# Patient Record
Sex: FEMALE | Race: OTHER | NOT HISPANIC OR LATINO
[De-identification: names, ages, dates, MRNs, and addresses within clinical notes are randomized per-mention and may not be internally consistent; named-entity substitution may affect disease eponyms.]

---

## 2019-05-07 PROBLEM — Z00.00 ENCOUNTER FOR PREVENTIVE HEALTH EXAMINATION: Status: ACTIVE | Noted: 2019-05-07

## 2019-05-14 ENCOUNTER — APPOINTMENT (OUTPATIENT)
Dept: ANTEPARTUM | Facility: CLINIC | Age: 29
End: 2019-05-14
Payer: MEDICAID

## 2019-05-14 ENCOUNTER — ASOB RESULT (OUTPATIENT)
Age: 29
End: 2019-05-14

## 2019-05-14 PROCEDURE — 76813 OB US NUCHAL MEAS 1 GEST: CPT

## 2019-05-14 PROCEDURE — 76801 OB US < 14 WKS SINGLE FETUS: CPT

## 2019-05-14 PROCEDURE — 36416 COLLJ CAPILLARY BLOOD SPEC: CPT

## 2019-07-08 ENCOUNTER — RECORD ABSTRACTING (OUTPATIENT)
Age: 29
End: 2019-07-08

## 2019-07-08 DIAGNOSIS — Z87.440 PERSONAL HISTORY OF URINARY (TRACT) INFECTIONS: ICD-10-CM

## 2019-07-08 LAB — CYTOLOGY CVX/VAG DOC THIN PREP: NORMAL

## 2019-07-12 ENCOUNTER — ASOB RESULT (OUTPATIENT)
Age: 29
End: 2019-07-12

## 2019-07-12 ENCOUNTER — APPOINTMENT (OUTPATIENT)
Dept: ANTEPARTUM | Facility: CLINIC | Age: 29
End: 2019-07-12
Payer: MEDICAID

## 2019-07-12 PROCEDURE — 76811 OB US DETAILED SNGL FETUS: CPT

## 2019-07-22 ENCOUNTER — APPOINTMENT (OUTPATIENT)
Dept: OBGYN | Facility: CLINIC | Age: 29
End: 2019-07-22

## 2019-07-22 ENCOUNTER — APPOINTMENT (OUTPATIENT)
Dept: OBGYN | Facility: CLINIC | Age: 29
End: 2019-07-22
Payer: MEDICAID

## 2019-07-22 ENCOUNTER — NON-APPOINTMENT (OUTPATIENT)
Age: 29
End: 2019-07-22

## 2019-07-22 VITALS
HEART RATE: 77 BPM | DIASTOLIC BLOOD PRESSURE: 63 MMHG | SYSTOLIC BLOOD PRESSURE: 97 MMHG | OXYGEN SATURATION: 98 % | WEIGHT: 144 LBS

## 2019-07-22 VITALS — HEIGHT: 63 IN | BODY MASS INDEX: 25.51 KG/M2

## 2019-07-22 PROCEDURE — 99213 OFFICE O/P EST LOW 20 MIN: CPT | Mod: TH

## 2019-07-26 ENCOUNTER — APPOINTMENT (OUTPATIENT)
Dept: ANTEPARTUM | Facility: CLINIC | Age: 29
End: 2019-07-26
Payer: MEDICAID

## 2019-07-26 ENCOUNTER — ASOB RESULT (OUTPATIENT)
Age: 29
End: 2019-07-26

## 2019-07-26 PROCEDURE — 76816 OB US FOLLOW-UP PER FETUS: CPT

## 2019-08-13 ENCOUNTER — APPOINTMENT (OUTPATIENT)
Dept: OBGYN | Facility: CLINIC | Age: 29
End: 2019-08-13
Payer: MEDICAID

## 2019-08-13 ENCOUNTER — LABORATORY RESULT (OUTPATIENT)
Age: 29
End: 2019-08-13

## 2019-08-13 ENCOUNTER — NON-APPOINTMENT (OUTPATIENT)
Age: 29
End: 2019-08-13

## 2019-08-13 VITALS
BODY MASS INDEX: 26.93 KG/M2 | DIASTOLIC BLOOD PRESSURE: 70 MMHG | WEIGHT: 152 LBS | HEART RATE: 75 BPM | SYSTOLIC BLOOD PRESSURE: 106 MMHG | HEIGHT: 63 IN

## 2019-08-13 PROCEDURE — 99213 OFFICE O/P EST LOW 20 MIN: CPT | Mod: TH

## 2019-08-13 RX ORDER — PRENATAL NO.167/FOLIC ACID/DHA 0.4MG-25MG
0.4-25 TABLET,CHEWABLE ORAL ONCE
Qty: 30 | Refills: 6 | Status: ACTIVE | COMMUNITY
Start: 2019-08-13 | End: 1900-01-01

## 2019-08-13 RX ORDER — MULTIVIT-MIN/FOLIC/VIT K/LYCOP 400-300MCG
28-0.8 TABLET ORAL DAILY
Refills: 0 | Status: DISCONTINUED | COMMUNITY
End: 2019-08-13

## 2019-08-14 LAB
BASOPHILS # BLD AUTO: 0.03 K/UL
BASOPHILS NFR BLD AUTO: 0.3 %
EOSINOPHIL # BLD AUTO: 0.2 K/UL
EOSINOPHIL NFR BLD AUTO: 1.8 %
HCT VFR BLD CALC: 36.4 %
HGB BLD-MCNC: 12 G/DL
IMM GRANULOCYTES NFR BLD AUTO: 0.6 %
LYMPHOCYTES # BLD AUTO: 2.02 K/UL
LYMPHOCYTES NFR BLD AUTO: 18.2 %
MAN DIFF?: NORMAL
MCHC RBC-ENTMCNC: 30.5 PG
MCHC RBC-ENTMCNC: 33 GM/DL
MCV RBC AUTO: 92.6 FL
MONOCYTES # BLD AUTO: 0.81 K/UL
MONOCYTES NFR BLD AUTO: 7.3 %
NEUTROPHILS # BLD AUTO: 7.97 K/UL
NEUTROPHILS NFR BLD AUTO: 71.8 %
PLATELET # BLD AUTO: 237 K/UL
RBC # BLD: 3.93 M/UL
RBC # FLD: 13 %
VZV AB TITR SER: POSITIVE
VZV IGG SER IF-ACNC: 1835 INDEX
WBC # FLD AUTO: 11.1 K/UL

## 2019-08-15 LAB — LEAD BLD-MCNC: <1 UG/DL

## 2019-09-16 ENCOUNTER — NON-APPOINTMENT (OUTPATIENT)
Age: 29
End: 2019-09-16

## 2019-09-16 ENCOUNTER — APPOINTMENT (OUTPATIENT)
Dept: OBGYN | Facility: CLINIC | Age: 29
End: 2019-09-16
Payer: MEDICAID

## 2019-09-16 VITALS
SYSTOLIC BLOOD PRESSURE: 113 MMHG | DIASTOLIC BLOOD PRESSURE: 73 MMHG | HEIGHT: 63 IN | WEIGHT: 160 LBS | BODY MASS INDEX: 28.35 KG/M2

## 2019-09-16 PROCEDURE — 99213 OFFICE O/P EST LOW 20 MIN: CPT | Mod: TH

## 2019-09-30 ENCOUNTER — APPOINTMENT (OUTPATIENT)
Dept: OBGYN | Facility: CLINIC | Age: 29
End: 2019-09-30
Payer: MEDICAID

## 2019-09-30 ENCOUNTER — NON-APPOINTMENT (OUTPATIENT)
Age: 29
End: 2019-09-30

## 2019-09-30 VITALS
SYSTOLIC BLOOD PRESSURE: 110 MMHG | BODY MASS INDEX: 28.88 KG/M2 | DIASTOLIC BLOOD PRESSURE: 74 MMHG | HEIGHT: 63 IN | WEIGHT: 163 LBS

## 2019-09-30 DIAGNOSIS — Z86.19 PERSONAL HISTORY OF OTHER INFECTIOUS AND PARASITIC DISEASES: ICD-10-CM

## 2019-09-30 PROCEDURE — 99213 OFFICE O/P EST LOW 20 MIN: CPT | Mod: TH

## 2019-10-01 LAB
C TRACH RRNA SPEC QL NAA+PROBE: NOT DETECTED
N GONORRHOEA RRNA SPEC QL NAA+PROBE: NOT DETECTED
SOURCE AMPLIFICATION: NORMAL

## 2019-10-02 DIAGNOSIS — B96.89 ACUTE VAGINITIS: ICD-10-CM

## 2019-10-02 DIAGNOSIS — N76.0 ACUTE VAGINITIS: ICD-10-CM

## 2019-10-02 LAB
CANDIDA VAG CYTO: DETECTED
G VAGINALIS+PREV SP MTYP VAG QL MICRO: DETECTED
T VAGINALIS VAG QL WET PREP: NOT DETECTED

## 2019-10-07 ENCOUNTER — TRANSCRIPTION ENCOUNTER (OUTPATIENT)
Age: 29
End: 2019-10-07

## 2019-10-14 ENCOUNTER — APPOINTMENT (OUTPATIENT)
Dept: ANTEPARTUM | Facility: CLINIC | Age: 29
End: 2019-10-14
Payer: MEDICAID

## 2019-10-14 ENCOUNTER — NON-APPOINTMENT (OUTPATIENT)
Age: 29
End: 2019-10-14

## 2019-10-14 ENCOUNTER — ASOB RESULT (OUTPATIENT)
Age: 29
End: 2019-10-14

## 2019-10-14 ENCOUNTER — APPOINTMENT (OUTPATIENT)
Dept: OBGYN | Facility: CLINIC | Age: 29
End: 2019-10-14
Payer: MEDICAID

## 2019-10-14 VITALS
SYSTOLIC BLOOD PRESSURE: 116 MMHG | DIASTOLIC BLOOD PRESSURE: 72 MMHG | BODY MASS INDEX: 29.41 KG/M2 | WEIGHT: 166 LBS | HEIGHT: 63 IN

## 2019-10-14 PROCEDURE — 76816 OB US FOLLOW-UP PER FETUS: CPT

## 2019-10-14 PROCEDURE — 99213 OFFICE O/P EST LOW 20 MIN: CPT | Mod: TH

## 2019-10-30 ENCOUNTER — NON-APPOINTMENT (OUTPATIENT)
Age: 29
End: 2019-10-30

## 2019-10-30 ENCOUNTER — APPOINTMENT (OUTPATIENT)
Dept: OBGYN | Facility: CLINIC | Age: 29
End: 2019-10-30
Payer: MEDICAID

## 2019-10-30 VITALS
DIASTOLIC BLOOD PRESSURE: 71 MMHG | WEIGHT: 172 LBS | BODY MASS INDEX: 30.48 KG/M2 | SYSTOLIC BLOOD PRESSURE: 114 MMHG | HEIGHT: 63 IN

## 2019-10-30 PROCEDURE — 99213 OFFICE O/P EST LOW 20 MIN: CPT | Mod: TH

## 2019-11-01 LAB
GP B STREP DNA SPEC QL NAA+PROBE: NORMAL
GP B STREP DNA SPEC QL NAA+PROBE: NOT DETECTED
SOURCE GBS: NORMAL

## 2019-11-03 LAB
GLUCOSE 1H P 50 G GLC PO SERPL-MCNC: 85 MG/DL
HIV1+2 AB SPEC QL IA.RAPID: NONREACTIVE

## 2019-11-04 ENCOUNTER — APPOINTMENT (OUTPATIENT)
Dept: OBGYN | Facility: CLINIC | Age: 29
End: 2019-11-04
Payer: MEDICAID

## 2019-11-04 VITALS
BODY MASS INDEX: 30.48 KG/M2 | HEIGHT: 63 IN | SYSTOLIC BLOOD PRESSURE: 109 MMHG | DIASTOLIC BLOOD PRESSURE: 76 MMHG | WEIGHT: 172 LBS

## 2019-11-04 PROCEDURE — 99213 OFFICE O/P EST LOW 20 MIN: CPT | Mod: TH

## 2019-11-11 ENCOUNTER — NON-APPOINTMENT (OUTPATIENT)
Age: 29
End: 2019-11-11

## 2019-11-11 ENCOUNTER — APPOINTMENT (OUTPATIENT)
Dept: OBGYN | Facility: CLINIC | Age: 29
End: 2019-11-11
Payer: MEDICAID

## 2019-11-11 VITALS
SYSTOLIC BLOOD PRESSURE: 110 MMHG | WEIGHT: 172 LBS | HEIGHT: 63 IN | DIASTOLIC BLOOD PRESSURE: 75 MMHG | BODY MASS INDEX: 30.48 KG/M2

## 2019-11-11 PROCEDURE — 99213 OFFICE O/P EST LOW 20 MIN: CPT | Mod: TH

## 2019-11-18 ENCOUNTER — OUTPATIENT (OUTPATIENT)
Dept: OUTPATIENT SERVICES | Facility: HOSPITAL | Age: 29
LOS: 1 days | End: 2019-11-18
Payer: MEDICAID

## 2019-11-18 ENCOUNTER — APPOINTMENT (OUTPATIENT)
Dept: OBGYN | Facility: CLINIC | Age: 29
End: 2019-11-18
Payer: MEDICAID

## 2019-11-18 ENCOUNTER — NON-APPOINTMENT (OUTPATIENT)
Age: 29
End: 2019-11-18

## 2019-11-18 VITALS
BODY MASS INDEX: 30.48 KG/M2 | HEIGHT: 63 IN | WEIGHT: 172 LBS | DIASTOLIC BLOOD PRESSURE: 74 MMHG | SYSTOLIC BLOOD PRESSURE: 111 MMHG

## 2019-11-18 DIAGNOSIS — Z3A.39 39 WEEKS GESTATION OF PREGNANCY: ICD-10-CM

## 2019-11-18 DIAGNOSIS — Z01.818 ENCOUNTER FOR OTHER PREPROCEDURAL EXAMINATION: ICD-10-CM

## 2019-11-18 LAB
ANION GAP SERPL CALC-SCNC: 9 MMOL/L — SIGNIFICANT CHANGE UP (ref 5–17)
APTT BLD: 29.1 SEC — SIGNIFICANT CHANGE UP (ref 27.5–36.3)
BLD GP AB SCN SERPL QL: SIGNIFICANT CHANGE UP
BUN SERPL-MCNC: 9 MG/DL — SIGNIFICANT CHANGE UP (ref 7–18)
CALCIUM SERPL-MCNC: 8.8 MG/DL — SIGNIFICANT CHANGE UP (ref 8.4–10.5)
CHLORIDE SERPL-SCNC: 108 MMOL/L — SIGNIFICANT CHANGE UP (ref 96–108)
CO2 SERPL-SCNC: 21 MMOL/L — LOW (ref 22–31)
CREAT SERPL-MCNC: 0.65 MG/DL — SIGNIFICANT CHANGE UP (ref 0.5–1.3)
GLUCOSE SERPL-MCNC: 71 MG/DL — SIGNIFICANT CHANGE UP (ref 70–99)
HCT VFR BLD CALC: 39.5 % — SIGNIFICANT CHANGE UP (ref 34.5–45)
HGB BLD-MCNC: 13.3 G/DL — SIGNIFICANT CHANGE UP (ref 11.5–15.5)
INR BLD: 0.88 RATIO — SIGNIFICANT CHANGE UP (ref 0.88–1.16)
MCHC RBC-ENTMCNC: 30.2 PG — SIGNIFICANT CHANGE UP (ref 27–34)
MCHC RBC-ENTMCNC: 33.7 GM/DL — SIGNIFICANT CHANGE UP (ref 32–36)
MCV RBC AUTO: 89.8 FL — SIGNIFICANT CHANGE UP (ref 80–100)
NRBC # BLD: 0 /100 WBCS — SIGNIFICANT CHANGE UP (ref 0–0)
PLATELET # BLD AUTO: 223 K/UL — SIGNIFICANT CHANGE UP (ref 150–400)
POTASSIUM SERPL-MCNC: 3.7 MMOL/L — SIGNIFICANT CHANGE UP (ref 3.5–5.3)
POTASSIUM SERPL-SCNC: 3.7 MMOL/L — SIGNIFICANT CHANGE UP (ref 3.5–5.3)
PROTHROM AB SERPL-ACNC: 9.7 SEC — LOW (ref 10–12.9)
RBC # BLD: 4.4 M/UL — SIGNIFICANT CHANGE UP (ref 3.8–5.2)
RBC # FLD: 13.4 % — SIGNIFICANT CHANGE UP (ref 10.3–14.5)
SODIUM SERPL-SCNC: 138 MMOL/L — SIGNIFICANT CHANGE UP (ref 135–145)
WBC # BLD: 10.54 K/UL — HIGH (ref 3.8–10.5)
WBC # FLD AUTO: 10.54 K/UL — HIGH (ref 3.8–10.5)

## 2019-11-18 PROCEDURE — 86780 TREPONEMA PALLIDUM: CPT

## 2019-11-18 PROCEDURE — G0463: CPT

## 2019-11-18 PROCEDURE — 99213 OFFICE O/P EST LOW 20 MIN: CPT | Mod: TH

## 2019-11-19 LAB — T PALLIDUM AB TITR SER: NEGATIVE — SIGNIFICANT CHANGE UP

## 2019-11-21 ENCOUNTER — TRANSCRIPTION ENCOUNTER (OUTPATIENT)
Age: 29
End: 2019-11-21

## 2019-11-22 ENCOUNTER — INPATIENT (INPATIENT)
Facility: HOSPITAL | Age: 29
LOS: 2 days | Discharge: HOME | End: 2019-11-25
Attending: OBSTETRICS & GYNECOLOGY | Admitting: OBSTETRICS & GYNECOLOGY
Payer: MEDICAID

## 2019-11-22 ENCOUNTER — RESULT REVIEW (OUTPATIENT)
Age: 29
End: 2019-11-22

## 2019-11-22 ENCOUNTER — TRANSCRIPTION ENCOUNTER (OUTPATIENT)
Age: 29
End: 2019-11-22

## 2019-11-22 VITALS — WEIGHT: 174.17 LBS | HEIGHT: 63 IN

## 2019-11-22 DIAGNOSIS — Z3A.39 39 WEEKS GESTATION OF PREGNANCY: ICD-10-CM

## 2019-11-22 PROCEDURE — 59514 CESAREAN DELIVERY ONLY: CPT | Mod: AS,U9

## 2019-11-22 PROCEDURE — 88307 TISSUE EXAM BY PATHOLOGIST: CPT | Mod: 26

## 2019-11-22 PROCEDURE — 59514 CESAREAN DELIVERY ONLY: CPT | Mod: U9

## 2019-11-22 RX ORDER — OXYCODONE HYDROCHLORIDE 5 MG/1
5 TABLET ORAL
Refills: 0 | Status: DISCONTINUED | OUTPATIENT
Start: 2019-11-22 | End: 2019-11-25

## 2019-11-22 RX ORDER — SODIUM CHLORIDE 9 MG/ML
1000 INJECTION, SOLUTION INTRAVENOUS
Refills: 0 | Status: DISCONTINUED | OUTPATIENT
Start: 2019-11-22 | End: 2019-11-23

## 2019-11-22 RX ORDER — KETOROLAC TROMETHAMINE 30 MG/ML
30 SYRINGE (ML) INJECTION EVERY 6 HOURS
Refills: 0 | Status: DISCONTINUED | OUTPATIENT
Start: 2019-11-22 | End: 2019-11-23

## 2019-11-22 RX ORDER — SIMETHICONE 80 MG/1
80 TABLET, CHEWABLE ORAL EVERY 4 HOURS
Refills: 0 | Status: DISCONTINUED | OUTPATIENT
Start: 2019-11-22 | End: 2019-11-25

## 2019-11-22 RX ORDER — CITRIC ACID/SODIUM CITRATE 300-500 MG
30 SOLUTION, ORAL ORAL ONCE
Refills: 0 | Status: COMPLETED | OUTPATIENT
Start: 2019-11-22 | End: 2019-11-22

## 2019-11-22 RX ORDER — OXYCODONE HYDROCHLORIDE 5 MG/1
5 TABLET ORAL ONCE
Refills: 0 | Status: DISCONTINUED | OUTPATIENT
Start: 2019-11-22 | End: 2019-11-25

## 2019-11-22 RX ORDER — DIPHENHYDRAMINE HCL 50 MG
25 CAPSULE ORAL EVERY 6 HOURS
Refills: 0 | Status: DISCONTINUED | OUTPATIENT
Start: 2019-11-22 | End: 2019-11-25

## 2019-11-22 RX ORDER — HEPARIN SODIUM 5000 [USP'U]/ML
5000 INJECTION INTRAVENOUS; SUBCUTANEOUS EVERY 12 HOURS
Refills: 0 | Status: DISCONTINUED | OUTPATIENT
Start: 2019-11-22 | End: 2019-11-25

## 2019-11-22 RX ORDER — LANOLIN
1 OINTMENT (GRAM) TOPICAL EVERY 6 HOURS
Refills: 0 | Status: DISCONTINUED | OUTPATIENT
Start: 2019-11-22 | End: 2019-11-25

## 2019-11-22 RX ORDER — OXYTOCIN 10 UNIT/ML
333.33 VIAL (ML) INJECTION
Qty: 20 | Refills: 0 | Status: DISCONTINUED | OUTPATIENT
Start: 2019-11-22 | End: 2019-11-23

## 2019-11-22 RX ORDER — CEFAZOLIN SODIUM 1 G
2000 VIAL (EA) INJECTION ONCE
Refills: 0 | Status: COMPLETED | OUTPATIENT
Start: 2019-11-22 | End: 2019-11-22

## 2019-11-22 RX ORDER — METOCLOPRAMIDE HCL 10 MG
10 TABLET ORAL ONCE
Refills: 0 | Status: DISCONTINUED | OUTPATIENT
Start: 2019-11-22 | End: 2019-11-22

## 2019-11-22 RX ORDER — SODIUM CHLORIDE 9 MG/ML
1000 INJECTION, SOLUTION INTRAVENOUS
Refills: 0 | Status: DISCONTINUED | OUTPATIENT
Start: 2019-11-22 | End: 2019-11-22

## 2019-11-22 RX ORDER — MAGNESIUM HYDROXIDE 400 MG/1
30 TABLET, CHEWABLE ORAL DAILY
Refills: 0 | Status: DISCONTINUED | OUTPATIENT
Start: 2019-11-22 | End: 2019-11-25

## 2019-11-22 RX ORDER — FAMOTIDINE 10 MG/ML
20 INJECTION INTRAVENOUS ONCE
Refills: 0 | Status: COMPLETED | OUTPATIENT
Start: 2019-11-22 | End: 2019-11-22

## 2019-11-22 RX ORDER — ACETAMINOPHEN 500 MG
975 TABLET ORAL
Refills: 0 | Status: DISCONTINUED | OUTPATIENT
Start: 2019-11-22 | End: 2019-11-25

## 2019-11-22 RX ORDER — FERROUS SULFATE 325(65) MG
325 TABLET ORAL DAILY
Refills: 0 | Status: DISCONTINUED | OUTPATIENT
Start: 2019-11-23 | End: 2019-11-25

## 2019-11-22 RX ORDER — AZITHROMYCIN 500 MG/1
500 TABLET, FILM COATED ORAL EVERY 24 HOURS
Refills: 0 | Status: COMPLETED | OUTPATIENT
Start: 2019-11-22 | End: 2019-11-22

## 2019-11-22 RX ORDER — GLYCERIN ADULT
1 SUPPOSITORY, RECTAL RECTAL AT BEDTIME
Refills: 0 | Status: DISCONTINUED | OUTPATIENT
Start: 2019-11-22 | End: 2019-11-25

## 2019-11-22 RX ORDER — SODIUM CHLORIDE 9 MG/ML
1000 INJECTION, SOLUTION INTRAVENOUS ONCE
Refills: 0 | Status: COMPLETED | OUTPATIENT
Start: 2019-11-22 | End: 2019-11-22

## 2019-11-22 RX ORDER — IBUPROFEN 200 MG
600 TABLET ORAL EVERY 6 HOURS
Refills: 0 | Status: COMPLETED | OUTPATIENT
Start: 2019-11-22 | End: 2020-10-20

## 2019-11-22 RX ORDER — TETANUS TOXOID, REDUCED DIPHTHERIA TOXOID AND ACELLULAR PERTUSSIS VACCINE, ADSORBED 5; 2.5; 8; 8; 2.5 [IU]/.5ML; [IU]/.5ML; UG/.5ML; UG/.5ML; UG/.5ML
0.5 SUSPENSION INTRAMUSCULAR ONCE
Refills: 0 | Status: DISCONTINUED | OUTPATIENT
Start: 2019-11-22 | End: 2019-11-25

## 2019-11-22 RX ORDER — FOLIC ACID 0.8 MG
1 TABLET ORAL DAILY
Refills: 0 | Status: DISCONTINUED | OUTPATIENT
Start: 2019-11-23 | End: 2019-11-25

## 2019-11-22 RX ADMIN — SODIUM CHLORIDE 125 MILLILITER(S): 9 INJECTION, SOLUTION INTRAVENOUS at 12:00

## 2019-11-22 RX ADMIN — Medication 100 MILLIGRAM(S): at 07:53

## 2019-11-22 RX ADMIN — Medication 975 MILLIGRAM(S): at 22:07

## 2019-11-22 RX ADMIN — Medication 30 MILLIGRAM(S): at 10:45

## 2019-11-22 RX ADMIN — Medication 30 MILLIGRAM(S): at 23:39

## 2019-11-22 RX ADMIN — SIMETHICONE 80 MILLIGRAM(S): 80 TABLET, CHEWABLE ORAL at 21:36

## 2019-11-22 RX ADMIN — Medication 1000 MILLIUNIT(S)/MIN: at 10:24

## 2019-11-22 RX ADMIN — Medication 975 MILLIGRAM(S): at 21:37

## 2019-11-22 RX ADMIN — SIMETHICONE 80 MILLIGRAM(S): 80 TABLET, CHEWABLE ORAL at 18:51

## 2019-11-22 RX ADMIN — Medication 30 MILLIGRAM(S): at 10:26

## 2019-11-22 RX ADMIN — SIMETHICONE 80 MILLIGRAM(S): 80 TABLET, CHEWABLE ORAL at 15:23

## 2019-11-22 RX ADMIN — SODIUM CHLORIDE 2000 MILLILITER(S): 9 INJECTION, SOLUTION INTRAVENOUS at 07:49

## 2019-11-22 RX ADMIN — FAMOTIDINE 20 MILLIGRAM(S): 10 INJECTION INTRAVENOUS at 07:47

## 2019-11-22 RX ADMIN — AZITHROMYCIN 255 MILLIGRAM(S): 500 TABLET, FILM COATED ORAL at 08:15

## 2019-11-22 RX ADMIN — HEPARIN SODIUM 5000 UNIT(S): 5000 INJECTION INTRAVENOUS; SUBCUTANEOUS at 18:52

## 2019-11-22 RX ADMIN — Medication 30 MILLILITER(S): at 07:47

## 2019-11-22 NOTE — DISCHARGE NOTE OB - CARE PLAN
Principal Discharge DX:	 delivery delivered  Goal:	doing good  Assessment and plan of treatment:	f/u [id number] in 2weeks,regular diet,activity as tolerated  Secondary Diagnosis:	Breech birth Principal Discharge DX:	 delivery delivered  Goal:	wound check in office 1-2weeks call and set up appointment with dr tan  Assessment and plan of treatment:	no sex nothing in vagina no heavy lifting no pushing no straining no strenuous activities  pain medication as needed; stool softener; dulcolax as needed if constipated  walk for exercise: helps recovery   continue prenatal vitamins daily especially whole course of breastfeeding  see your OB in the office for follow up post partum check call the office set up appointment in 1-2weeks  clean wound daily with soap and water; please note wound for redness or swelling; if noted go to the office right away so the doctor can evaluate the wound for possible wound infection  Secondary Diagnosis:	Breech birth Principal Discharge DX:	 delivery delivered  Goal:	wound check in office 1-2weeks call and set up appointment with dr tan  Assessment and plan of treatment:	no sex nothing in vagina no heavy lifting no pushing no straining no strenuous activities  pain medication as needed; stool softener; dulcolax as needed if constipated  walk for exercise: helps recovery   continue prenatal vitamins daily especially whole course of breastfeeding  see your OB in the office for follow up post partum check call the office set up appointment in 1-2weeks  clean wound daily with soap and water; please note wound for redness or swelling; if noted go to the office right away so the doctor can evaluate the wound for possible wound infection  Secondary Diagnosis:	Breech birth  Secondary Diagnosis:	Acute blood loss as cause of postoperative anemia  Goal:	take iron daily

## 2019-11-22 NOTE — DISCHARGE NOTE OB - CARE PROVIDER_API CALL
Dmitry Blevins)  Obstetrics and Gynecology  [address], [address]nd [place] [address] [place] [address]  Phone: [phone]  Fax: [phone]  Follow Up Time:

## 2019-11-22 NOTE — DISCHARGE NOTE OB - MEDICATION SUMMARY - MEDICATIONS TO TAKE
I will START or STAY ON the medications listed below when I get home from the hospital:    acetaminophen 500 mg oral capsule  -- 2 cap(s) by mouth every 6 hours   -- This product contains acetaminophen.  Do not use  with any other product containing acetaminophen to prevent possible liver damage.    -- Indication: For for pain    ketorolac 10 mg oral tablet  -- 1 tab(s) by mouth 4 times a day   -- It is very important that you take or use this exactly as directed.  Do not skip doses or discontinue unless directed by your doctor.  May cause drowsiness or dizziness.  Obtain medical advice before taking any non-prescription drugs as some may affect the action of this medication.  Take with food or milk.    -- Indication: For for severe pain    Prenatal Multivitamins with Folic Acid 1 mg oral tablet  -- 1 tab(s) by mouth once a day  -- Indication: For supplement    Slow Release Iron (as elemental iron) 45 mg oral tablet, extended release  -- 1 tab(s) by mouth once a day   -- Check with your doctor before becoming pregnant.  May discolor urine or feces.  Some non-prescription drugs may aggravate your condition.  Read all labels carefully.  If a warning appears, check with your doctor before taking.  Swallow whole.  Do not crush.    -- Indication: For mild anemia post csection    Nasrin-Colace 50 mg-8.6 mg oral tablet  -- 2 tab(s) by mouth once a day (at bedtime)   -- Medication should be taken with plenty of water.    -- Indication: For take daily to prevent constipation    simethicone 80 mg oral tablet, chewable  -- 1 tab(s) by mouth every 4 hours  -- Indication: For helps pass gas

## 2019-11-22 NOTE — DISCHARGE NOTE OB - PATIENT PORTAL LINK FT
You can access the FollowMyHealth Patient Portal offered by [facility] by registering at the following website: [email or web address]. By joining [employer]’s FollowMyHealth portal, you will also be able to view your health information using other applications (apps) compatible with our system.

## 2019-11-22 NOTE — PATIENT PROFILE OB - FALLEN IN THE PAST
no Patient with uncontrolled hypertension and new onset of left hand and left chest pain. Will get bp under control and then will have patient undergo an exercise stress test.

## 2019-11-22 NOTE — DISCHARGE NOTE OB - PLAN OF CARE
doing good f/u [id number] in 2weeks,regular diet,activity as tolerated wound check in office 1-2weeks call and set up appointment with dr tan no sex nothing in vagina no heavy lifting no pushing no straining no strenuous activities  pain medication as needed; stool softener; dulcolax as needed if constipated  walk for exercise: helps recovery   continue prenatal vitamins daily especially whole course of breastfeeding  see your OB in the office for follow up post partum check call the office set up appointment in 1-2weeks  clean wound daily with soap and water; please note wound for redness or swelling; if noted go to the office right away so the doctor can evaluate the wound for possible wound infection take iron daily

## 2019-11-22 NOTE — PROGRESS NOTE ADULT - SUBJECTIVE AND OBJECTIVE BOX
post op day 0  prim cs for breech    pt resting well   offers no acute complaints  pain well controlled    Vital Signs Last 24 Hrs  T(C): 36.4 (22 Nov 2019 12:52), Max: 36.4 (22 Nov 2019 12:52)  T(F): 97.6 (22 Nov 2019 12:52), Max: 97.6 (22 Nov 2019 12:52)  HR: 93 (22 Nov 2019 12:52) (64 - 99)  BP: 105/64 (22 Nov 2019 12:52) (105/64 - 125/73)  BP(mean): 79 (22 Nov 2019 11:02) (78 - 87)  RR: 18 (22 Nov 2019 12:52) (11 - 19)  SpO2: 99% (22 Nov 2019 12:52) (98% - 100%)    abd soft ND dressing c/d/intact  lochia min  ratliff clear urine output  ext +venodyne boots b/l    post op day 0  prim cs for breech  -cont routine post op care

## 2019-11-22 NOTE — DISCHARGE NOTE OB - PAIN PRESENT
Yes No/currently denies pain ...last pain meds given at approx 8am ..pain meds sent to pharmacy ..instructed  pt to  meds and  explained instructions pt  verbalized understanding

## 2019-11-22 NOTE — DISCHARGE NOTE OB - ADDITIONAL INSTRUCTIONS
Patient name: Carmelo Soto   Person calling: Ashleigh from [facility] pharmacy   Phone Number: [phone]   Best Availability:   Fax Number:   Facility name:  Verbal disclosure:   Permission to leave detailed message:   Who person saw gladys Pinto   Name of pharmacy:  [facility], [place]  Name of medication: BD pen needles   needed: No    Detailed reason for call:  Ashleigh from [facility] calling for refill of BD pen needles 4mm 32 gauge please.    given

## 2019-11-23 LAB
BASOPHILS # BLD AUTO: 0.02 K/UL — SIGNIFICANT CHANGE UP (ref 0–0.2)
BASOPHILS NFR BLD AUTO: 0.2 % — SIGNIFICANT CHANGE UP (ref 0–2)
EOSINOPHIL # BLD AUTO: 0.03 K/UL — SIGNIFICANT CHANGE UP (ref 0–0.5)
EOSINOPHIL NFR BLD AUTO: 0.3 % — SIGNIFICANT CHANGE UP (ref 0–6)
HCT VFR BLD CALC: 31.1 % — LOW (ref 34.5–45)
HGB BLD-MCNC: 10.4 G/DL — LOW (ref 11.5–15.5)
IMM GRANULOCYTES NFR BLD AUTO: 0.3 % — SIGNIFICANT CHANGE UP (ref 0–1.5)
LYMPHOCYTES # BLD AUTO: 1.73 K/UL — SIGNIFICANT CHANGE UP (ref 1–3.3)
LYMPHOCYTES # BLD AUTO: 20 % — SIGNIFICANT CHANGE UP (ref 13–44)
MCHC RBC-ENTMCNC: 30.2 PG — SIGNIFICANT CHANGE UP (ref 27–34)
MCHC RBC-ENTMCNC: 33.4 GM/DL — SIGNIFICANT CHANGE UP (ref 32–36)
MCV RBC AUTO: 90.4 FL — SIGNIFICANT CHANGE UP (ref 80–100)
MONOCYTES # BLD AUTO: 0.78 K/UL — SIGNIFICANT CHANGE UP (ref 0–0.9)
MONOCYTES NFR BLD AUTO: 9 % — SIGNIFICANT CHANGE UP (ref 2–14)
NEUTROPHILS # BLD AUTO: 6.06 K/UL — SIGNIFICANT CHANGE UP (ref 1.8–7.4)
NEUTROPHILS NFR BLD AUTO: 70.2 % — SIGNIFICANT CHANGE UP (ref 43–77)
NRBC # BLD: 0 /100 WBCS — SIGNIFICANT CHANGE UP (ref 0–0)
PLATELET # BLD AUTO: 157 K/UL — SIGNIFICANT CHANGE UP (ref 150–400)
RBC # BLD: 3.44 M/UL — LOW (ref 3.8–5.2)
RBC # FLD: 13.4 % — SIGNIFICANT CHANGE UP (ref 10.3–14.5)
WBC # BLD: 8.65 K/UL — SIGNIFICANT CHANGE UP (ref 3.8–10.5)
WBC # FLD AUTO: 8.65 K/UL — SIGNIFICANT CHANGE UP (ref 3.8–10.5)

## 2019-11-23 PROCEDURE — 99232 SBSQ HOSP IP/OBS MODERATE 35: CPT

## 2019-11-23 RX ORDER — IBUPROFEN 200 MG
600 TABLET ORAL EVERY 6 HOURS
Refills: 0 | Status: DISCONTINUED | OUTPATIENT
Start: 2019-11-23 | End: 2019-11-25

## 2019-11-23 RX ADMIN — HEPARIN SODIUM 5000 UNIT(S): 5000 INJECTION INTRAVENOUS; SUBCUTANEOUS at 18:48

## 2019-11-23 RX ADMIN — Medication 975 MILLIGRAM(S): at 22:40

## 2019-11-23 RX ADMIN — Medication 600 MILLIGRAM(S): at 23:12

## 2019-11-23 RX ADMIN — MAGNESIUM HYDROXIDE 30 MILLILITER(S): 400 TABLET, CHEWABLE ORAL at 13:15

## 2019-11-23 RX ADMIN — SIMETHICONE 80 MILLIGRAM(S): 80 TABLET, CHEWABLE ORAL at 22:07

## 2019-11-23 RX ADMIN — Medication 975 MILLIGRAM(S): at 10:04

## 2019-11-23 RX ADMIN — SIMETHICONE 80 MILLIGRAM(S): 80 TABLET, CHEWABLE ORAL at 10:03

## 2019-11-23 RX ADMIN — Medication 1 TABLET(S): at 13:15

## 2019-11-23 RX ADMIN — SIMETHICONE 80 MILLIGRAM(S): 80 TABLET, CHEWABLE ORAL at 16:44

## 2019-11-23 RX ADMIN — Medication 975 MILLIGRAM(S): at 22:06

## 2019-11-23 RX ADMIN — Medication 30 MILLIGRAM(S): at 05:38

## 2019-11-23 RX ADMIN — SIMETHICONE 80 MILLIGRAM(S): 80 TABLET, CHEWABLE ORAL at 18:48

## 2019-11-23 RX ADMIN — SIMETHICONE 80 MILLIGRAM(S): 80 TABLET, CHEWABLE ORAL at 05:37

## 2019-11-23 RX ADMIN — Medication 1 MILLIGRAM(S): at 13:15

## 2019-11-23 RX ADMIN — HEPARIN SODIUM 5000 UNIT(S): 5000 INJECTION INTRAVENOUS; SUBCUTANEOUS at 05:38

## 2019-11-23 RX ADMIN — Medication 975 MILLIGRAM(S): at 02:28

## 2019-11-23 RX ADMIN — Medication 975 MILLIGRAM(S): at 16:43

## 2019-11-23 RX ADMIN — Medication 30 MILLIGRAM(S): at 00:10

## 2019-11-23 RX ADMIN — Medication 975 MILLIGRAM(S): at 11:00

## 2019-11-23 RX ADMIN — Medication 975 MILLIGRAM(S): at 03:00

## 2019-11-23 RX ADMIN — Medication 30 MILLIGRAM(S): at 07:04

## 2019-11-23 RX ADMIN — SIMETHICONE 80 MILLIGRAM(S): 80 TABLET, CHEWABLE ORAL at 02:28

## 2019-11-23 RX ADMIN — Medication 600 MILLIGRAM(S): at 23:45

## 2019-11-23 RX ADMIN — Medication 975 MILLIGRAM(S): at 17:24

## 2019-11-23 RX ADMIN — Medication 325 MILLIGRAM(S): at 13:15

## 2019-11-23 NOTE — PROGRESS NOTE ADULT - SUBJECTIVE AND OBJECTIVE BOX
PA NOTE:  pod#1 s/p prim cs    in the room     Patient seen at bedside resting comfortably offers no new complaints. + ambulating; due to void post ratliff removal + flatus; tolerating regular diet. both breastfeeding and bottle feeding. Denies HA, CP, SOB, N/V/D,  no bm; dizziness, palpitations, worsening abdominal pain, worsening vaginal bleeding, or any other concerns.     Vital Signs Last 24 Hrs  T(C): 36.5 (2019 10:47), Max: 36.9 (2019 21:53)  T(F): 97.7 (2019 10:47), Max: 98.5 (2019 21:53)  HR: 78 (2019 10:47) (73 - 99)  BP: 102/63 (2019 10:47) (100/62 - 125/73)  BP(mean): --  RR: 16 (2019 10:47) (15 - 19)  SpO2: 98% (2019 10:47) (98% - 100%)    Gen: A&O x 3, NAD  Chest: CTABL  Cardiac: S1+S2+ RRR  Breast: Soft, nontender, nonengorged  Abdomen: +BS; soft; Nontender, nondistended, dressing removed Incision C/D/I steri strips in place   Gyn: Minimal lochia  Extremities: Nontender, DTRS 2+, no worsening edema                          10.4   8.65  )-----------( 157      ( 2019 06:59 )             31.1

## 2019-11-23 NOTE — PROGRESS NOTE ADULT - SUBJECTIVE AND OBJECTIVE BOX
Postpartum Note,  Section  She is a  29y woman who is now post-operative day: 3  HPI:    Subjective:  The patient feels well.  She is ambulating.   She is tolerating regular diet.  She denies nausea and vomiting.    Her pain is controlled.  She reports normal postpartum bleeding.  She is breastfeeding.  She is formula feeding.    Physical exam  Vital Signs Last 24 Hrs  T(C): 36.5 (2019 10:47), Max: 36.9 (2019 21:53)  T(F): 97.7 (2019 10:47), Max: 98.5 (2019 21:53)  HR: 78 (2019 10:47) (73 - 93)  BP: 102/63 (2019 10:47) (100/62 - 123/82)  BP(mean): --  RR: 16 (2019 10:47) (16 - 18)  SpO2: 98% (2019 10:47) (98% - 99%)          Gen: NAD  Breast: Soft, nontender, not engorged.  Abdomen: Soft, nontender, no distension , firm uterine fundus at umbilicus.  Incision: Clean, dry, and intact with steri strips,had no bowel movement  Pelvic: Normal lochia noted  Ext: No calf tenderness    LABS:                              10.4   8.65  )-----------( 157      ( 2019 06:59 )             31.1     Rubella status: immune    no known allergies  Assessment and Plan  POD # s/p  section day1  Doing well.  Encourage ambulation.

## 2019-11-23 NOTE — PROGRESS NOTE ADULT - ASSESSMENT
PA NOTE:  pod#1 s/p prim cs    in the room   -routine ob care post op  -rpt cbc in am  -dc planning in am    MEDICATIONS  (STANDING):  acetaminophen   Tablet .. 975 milliGRAM(s) Oral <User Schedule>  diphtheria/tetanus/pertussis (acellular) Vaccine (ADAcel) 0.5 milliLiter(s) IntraMuscular once  ferrous    sulfate 325 milliGRAM(s) Oral daily  folic acid 1 milliGRAM(s) Oral daily  heparin  Injectable 5000 Unit(s) SubCutaneous every 12 hours  ibuprofen  Tablet. 600 milliGRAM(s) Oral every 6 hours  magnesium hydroxide Suspension 30 milliLiter(s) Oral daily  prenatal multivitamin 1 Tablet(s) Oral daily  simethicone 80 milliGRAM(s) Chew every 4 hours    MEDICATIONS  (PRN):  diphenhydrAMINE 25 milliGRAM(s) Oral every 6 hours PRN Itching  glycerin Suppository - Adult 1 Suppository(s) Rectal at bedtime PRN Constipation  lanolin Ointment 1 Application(s) Topical every 6 hours PRN Sore Nipples  oxyCODONE    IR 5 milliGRAM(s) Oral every 3 hours PRN Moderate to Severe Pain (4-10)  oxyCODONE    IR 5 milliGRAM(s) Oral once PRN Moderate to Severe Pain (4-10)

## 2019-11-24 LAB
BASOPHILS # BLD AUTO: 0.02 K/UL — SIGNIFICANT CHANGE UP (ref 0–0.2)
BASOPHILS NFR BLD AUTO: 0.2 % — SIGNIFICANT CHANGE UP (ref 0–2)
EOSINOPHIL # BLD AUTO: 0.09 K/UL — SIGNIFICANT CHANGE UP (ref 0–0.5)
EOSINOPHIL NFR BLD AUTO: 1 % — SIGNIFICANT CHANGE UP (ref 0–6)
HCT VFR BLD CALC: 30.3 % — LOW (ref 34.5–45)
HGB BLD-MCNC: 10.1 G/DL — LOW (ref 11.5–15.5)
IMM GRANULOCYTES NFR BLD AUTO: 0.4 % — SIGNIFICANT CHANGE UP (ref 0–1.5)
LYMPHOCYTES # BLD AUTO: 2.25 K/UL — SIGNIFICANT CHANGE UP (ref 1–3.3)
LYMPHOCYTES # BLD AUTO: 24.1 % — SIGNIFICANT CHANGE UP (ref 13–44)
MCHC RBC-ENTMCNC: 30.1 PG — SIGNIFICANT CHANGE UP (ref 27–34)
MCHC RBC-ENTMCNC: 33.3 GM/DL — SIGNIFICANT CHANGE UP (ref 32–36)
MCV RBC AUTO: 90.2 FL — SIGNIFICANT CHANGE UP (ref 80–100)
MONOCYTES # BLD AUTO: 0.79 K/UL — SIGNIFICANT CHANGE UP (ref 0–0.9)
MONOCYTES NFR BLD AUTO: 8.5 % — SIGNIFICANT CHANGE UP (ref 2–14)
NEUTROPHILS # BLD AUTO: 6.13 K/UL — SIGNIFICANT CHANGE UP (ref 1.8–7.4)
NEUTROPHILS NFR BLD AUTO: 65.8 % — SIGNIFICANT CHANGE UP (ref 43–77)
NRBC # BLD: 0 /100 WBCS — SIGNIFICANT CHANGE UP (ref 0–0)
PLATELET # BLD AUTO: 171 K/UL — SIGNIFICANT CHANGE UP (ref 150–400)
RBC # BLD: 3.36 M/UL — LOW (ref 3.8–5.2)
RBC # FLD: 13.6 % — SIGNIFICANT CHANGE UP (ref 10.3–14.5)
WBC # BLD: 9.32 K/UL — SIGNIFICANT CHANGE UP (ref 3.8–10.5)
WBC # FLD AUTO: 9.32 K/UL — SIGNIFICANT CHANGE UP (ref 3.8–10.5)

## 2019-11-24 PROCEDURE — 99232 SBSQ HOSP IP/OBS MODERATE 35: CPT

## 2019-11-24 RX ADMIN — SIMETHICONE 80 MILLIGRAM(S): 80 TABLET, CHEWABLE ORAL at 22:14

## 2019-11-24 RX ADMIN — Medication 600 MILLIGRAM(S): at 17:43

## 2019-11-24 RX ADMIN — Medication 600 MILLIGRAM(S): at 12:16

## 2019-11-24 RX ADMIN — Medication 600 MILLIGRAM(S): at 13:00

## 2019-11-24 RX ADMIN — SIMETHICONE 80 MILLIGRAM(S): 80 TABLET, CHEWABLE ORAL at 16:02

## 2019-11-24 RX ADMIN — SIMETHICONE 80 MILLIGRAM(S): 80 TABLET, CHEWABLE ORAL at 03:12

## 2019-11-24 RX ADMIN — Medication 325 MILLIGRAM(S): at 12:15

## 2019-11-24 RX ADMIN — Medication 1 MILLIGRAM(S): at 12:15

## 2019-11-24 RX ADMIN — Medication 975 MILLIGRAM(S): at 22:14

## 2019-11-24 RX ADMIN — HEPARIN SODIUM 5000 UNIT(S): 5000 INJECTION INTRAVENOUS; SUBCUTANEOUS at 17:42

## 2019-11-24 RX ADMIN — Medication 600 MILLIGRAM(S): at 18:12

## 2019-11-24 RX ADMIN — SIMETHICONE 80 MILLIGRAM(S): 80 TABLET, CHEWABLE ORAL at 06:11

## 2019-11-24 RX ADMIN — MAGNESIUM HYDROXIDE 30 MILLILITER(S): 400 TABLET, CHEWABLE ORAL at 12:15

## 2019-11-24 RX ADMIN — Medication 975 MILLIGRAM(S): at 10:00

## 2019-11-24 RX ADMIN — Medication 975 MILLIGRAM(S): at 23:15

## 2019-11-24 RX ADMIN — Medication 1 TABLET(S): at 12:15

## 2019-11-24 RX ADMIN — SIMETHICONE 80 MILLIGRAM(S): 80 TABLET, CHEWABLE ORAL at 09:06

## 2019-11-24 RX ADMIN — Medication 600 MILLIGRAM(S): at 06:11

## 2019-11-24 RX ADMIN — Medication 975 MILLIGRAM(S): at 03:12

## 2019-11-24 RX ADMIN — Medication 600 MILLIGRAM(S): at 06:53

## 2019-11-24 RX ADMIN — HEPARIN SODIUM 5000 UNIT(S): 5000 INJECTION INTRAVENOUS; SUBCUTANEOUS at 06:11

## 2019-11-24 RX ADMIN — Medication 975 MILLIGRAM(S): at 03:15

## 2019-11-24 RX ADMIN — Medication 975 MILLIGRAM(S): at 09:07

## 2019-11-24 RX ADMIN — SIMETHICONE 80 MILLIGRAM(S): 80 TABLET, CHEWABLE ORAL at 17:42

## 2019-11-24 NOTE — PROGRESS NOTE ADULT - REASON FOR ADMISSION
came for  section for breech presentation
came for primary  section for breech
scheduled primary cs for breech

## 2019-11-24 NOTE — PROGRESS NOTE ADULT - SUBJECTIVE AND OBJECTIVE BOX
Postpartum Note,  Section  She is a  29y woman who is now post-operative day: 3  HPI:    Subjective:  The patient feels well.  She is ambulating.   She is tolerating regular diet.  She denies nausea and vomiting.    Her pain is controlled.  She reports normal postpartum bleeding.  She is breastfeeding.  She is formula feeding.    Physical exam  Vital Signs Last 24 Hrs  T(C): 36.8 (2019 05:42), Max: 36.8 (2019 05:42)  T(F): 98.2 (2019 05:42), Max: 98.2 (2019 05:42)  HR: 77 (2019 05:42) (77 - 93)  BP: 107/68 (2019 05:42) (102/63 - 111/78)  BP(mean): --  RR: 16 (2019 05:42) (16 - 17)  SpO2: 97% (2019 05:42) (97% - 99%)          Gen: NAD  Breast: Soft, nontender, not engorged.  Abdomen: Soft, nontender, no distension , firm uterine fundus at umbilicus.  Incision: Clean, dry, and intact with steri strips,had no bowel movement  Pelvic: Normal lochia noted  Ext: No calf tenderness    LABS:                              10.1   9.32  )-----------( 171      ( 2019 07:10 )             30.3     Rubella status: immune    no known allergies  Assessment and Plan  POD # s/p  section day2  Doing well.  Encourage ambulation.d/c home tomorrow

## 2019-11-24 NOTE — PROGRESS NOTE ADULT - SUBJECTIVE AND OBJECTIVE BOX
Patient evaluated at bedside, offers no acute complaints.  Resting comfortably with baby at bedside.  Tolerating regular diet, Voiding without diffficulty; +flatus, -BM  Currently breast and bottlefeeding.  Denies fever/chills, nausea/vomiting, headache, dizziness, chest pains, shortness of breath, palpitations    Vital Signs Last 24 Hrs  T(C): 36.8 (24 Nov 2019 05:42), Max: 36.8 (24 Nov 2019 05:42)  T(F): 98.2 (24 Nov 2019 05:42), Max: 98.2 (24 Nov 2019 05:42)  HR: 77 (24 Nov 2019 05:42) (77 - 93)  BP: 107/68 (24 Nov 2019 05:42) (102/63 - 111/78)  RR: 16 (24 Nov 2019 05:42) (16 - 17)  SpO2: 97% (24 Nov 2019 05:42) (97% - 99%)    PE: Pt appears comfortable, NAD, AAOx3  Chest: CTA bilaterally, no W/R/R  Cardiac: RRR  Breasts: soft, NT/nonengorged bilaterally; no masses, no erythema  Abd: soft; NT; no guarding or rebound; +BS x4 quad; Fundus firm NT; incision C/D/I with steristrips in place, no erythema, no wound drainage or bleeding, no swelling  Gyn: minimal  Ext: soft, NT; DTRs 2+ bilaterally; no worsening edema                          10.1   9.32  )-----------( 171      ( 24 Nov 2019 07:10 )             30.3         d/w Dr. Blevins

## 2019-11-25 VITALS
HEART RATE: 70 BPM | SYSTOLIC BLOOD PRESSURE: 104 MMHG | RESPIRATION RATE: 16 BRPM | TEMPERATURE: 98 F | OXYGEN SATURATION: 99 % | DIASTOLIC BLOOD PRESSURE: 71 MMHG

## 2019-11-25 DIAGNOSIS — D62 ACUTE POSTHEMORRHAGIC ANEMIA: ICD-10-CM

## 2019-11-25 PROCEDURE — 88307 TISSUE EXAM BY PATHOLOGIST: CPT

## 2019-11-25 PROCEDURE — 36415 COLL VENOUS BLD VENIPUNCTURE: CPT

## 2019-11-25 PROCEDURE — 86901 BLOOD TYPING SEROLOGIC RH(D): CPT

## 2019-11-25 PROCEDURE — 86923 COMPATIBILITY TEST ELECTRIC: CPT

## 2019-11-25 PROCEDURE — 59050 FETAL MONITOR W/REPORT: CPT

## 2019-11-25 PROCEDURE — 99238 HOSP IP/OBS DSCHRG MGMT 30/<: CPT

## 2019-11-25 PROCEDURE — 86850 RBC ANTIBODY SCREEN: CPT

## 2019-11-25 PROCEDURE — 86900 BLOOD TYPING SEROLOGIC ABO: CPT

## 2019-11-25 PROCEDURE — 85027 COMPLETE CBC AUTOMATED: CPT

## 2019-11-25 RX ORDER — KETOROLAC TROMETHAMINE 30 MG/ML
1 SYRINGE (ML) INJECTION
Qty: 20 | Refills: 0
Start: 2019-11-25 | End: 2019-11-29

## 2019-11-25 RX ORDER — SENNOSIDES/DOCUSATE SODIUM 8.6MG-50MG
2 TABLET ORAL
Qty: 60 | Refills: 0
Start: 2019-11-25 | End: 2019-12-24

## 2019-11-25 RX ORDER — SIMETHICONE 80 MG/1
1 TABLET, CHEWABLE ORAL
Qty: 60 | Refills: 0
Start: 2019-11-25 | End: 2019-12-04

## 2019-11-25 RX ORDER — ACETAMINOPHEN 500 MG
2 TABLET ORAL
Qty: 40 | Refills: 1
Start: 2019-11-25 | End: 2019-12-04

## 2019-11-25 RX ORDER — FERROUS SULFATE 325(65) MG
1 TABLET ORAL
Qty: 30 | Refills: 0
Start: 2019-11-25 | End: 2019-12-24

## 2019-11-25 RX ADMIN — SIMETHICONE 80 MILLIGRAM(S): 80 TABLET, CHEWABLE ORAL at 05:10

## 2019-11-25 RX ADMIN — Medication 600 MILLIGRAM(S): at 00:24

## 2019-11-25 RX ADMIN — Medication 600 MILLIGRAM(S): at 01:00

## 2019-11-25 RX ADMIN — HEPARIN SODIUM 5000 UNIT(S): 5000 INJECTION INTRAVENOUS; SUBCUTANEOUS at 05:10

## 2019-11-25 RX ADMIN — Medication 975 MILLIGRAM(S): at 08:03

## 2019-11-25 RX ADMIN — Medication 600 MILLIGRAM(S): at 05:10

## 2019-11-25 RX ADMIN — Medication 600 MILLIGRAM(S): at 05:50

## 2019-11-25 NOTE — PROGRESS NOTE ADULT - ASSESSMENT
PA NOTE:  pod#3 prim cs   in the room   -dr tan coming to see pt  -dc plan today  -dc instructions verbalized; attend dc class

## 2019-11-25 NOTE — PROGRESS NOTE ADULT - SUBJECTIVE AND OBJECTIVE BOX
PA NOTE:  pod#3 prim cs   in the room     Patient seen at bedside resting comfortably offers no new complaints. + Ambulation, + void without difficulty, + flatus; +bm;  tolerating regular diet. both breastfeeding and bottle feeding. Denies HA, CP, SOB, N/V/D,  dizziness, palpitations, worsening abdominal pain, worsening vaginal bleeding, or any other concerns.     Vital Signs Last 24 Hrs  T(C): 36.6 (2019 05:09), Max: 36.8 (2019 12:18)  T(F): 97.8 (2019 05:09), Max: 98.3 (2019 12:18)  HR: 70 (2019 05:09) (70 - 88)  BP: 104/71 (2019 05:09) (104/71 - 117/79)  BP(mean): --  RR: 16 (2019 05:09) (16 - 17)  SpO2: 99% (2019 05:09) (98% - 99%)    Gen: A&O x 3, NAD  Chest: CTABL  Cardiac: S1+S2+ RRR  Breast: Soft, nontender, nonengorged  Abdomen: +BS; soft; Nontender, nondistended, dressing removed Incision C/D/I steri strips in place   Gyn: Minimal lochia  Extremities: Nontender, DTRS 2+, no worsening edema                          10.1   9.32  )-----------( 171      ( 2019 07:10 )             30.3

## 2019-12-11 ENCOUNTER — APPOINTMENT (OUTPATIENT)
Dept: OBGYN | Facility: CLINIC | Age: 29
End: 2019-12-11

## 2019-12-11 VITALS
HEIGHT: 63 IN | HEART RATE: 77 BPM | BODY MASS INDEX: 26.58 KG/M2 | TEMPERATURE: 97.7 F | WEIGHT: 150 LBS | OXYGEN SATURATION: 98 % | SYSTOLIC BLOOD PRESSURE: 112 MMHG | DIASTOLIC BLOOD PRESSURE: 73 MMHG

## 2019-12-11 DIAGNOSIS — Z34.00 ENCOUNTER FOR SUPERVISION OF NORMAL FIRST PREGNANCY, UNSPECIFIED TRIMESTER: ICD-10-CM

## 2019-12-11 DIAGNOSIS — O46.90 ANTEPARTUM HEMORRHAGE, UNSPECIFIED, UNSPECIFIED TRIMESTER: ICD-10-CM

## 2019-12-11 DIAGNOSIS — Z09 ENCOUNTER FOR FOLLOW-UP EXAMINATION AFTER COMPLETED TREATMENT FOR CONDITIONS OTHER THAN MALIGNANT NEOPLASM: ICD-10-CM

## 2019-12-11 RX ORDER — CAMPHOR 0.45 %
25 GEL (GRAM) TOPICAL
Qty: 20 | Refills: 0 | Status: DISCONTINUED | COMMUNITY
Start: 2019-09-30 | End: 2019-12-11

## 2019-12-11 NOTE — HISTORY OF PRESENT ILLNESS
[0/10] : no pain reported [Fever] : no fever [Chills] : no chills [Nausea] : no nausea [Clean/Dry/Intact] : clean, dry and intact [Vomiting] : no vomiting [Normal] : the vagina was normal [Erythema] : not erythematous [Doing Well] : is doing well [Excellent Pain Control] : has excellent pain control [No Sign of Infection] : is showing no signs of infection [None] : None

## 2020-01-08 ENCOUNTER — APPOINTMENT (OUTPATIENT)
Dept: OBGYN | Facility: CLINIC | Age: 30
End: 2020-01-08
Payer: MEDICAID

## 2020-01-08 ENCOUNTER — NON-APPOINTMENT (OUTPATIENT)
Age: 30
End: 2020-01-08

## 2020-01-08 VITALS
OXYGEN SATURATION: 98 % | DIASTOLIC BLOOD PRESSURE: 68 MMHG | WEIGHT: 150 LBS | HEART RATE: 78 BPM | HEIGHT: 63 IN | TEMPERATURE: 98 F | SYSTOLIC BLOOD PRESSURE: 102 MMHG | BODY MASS INDEX: 26.58 KG/M2

## 2020-01-08 PROCEDURE — 99214 OFFICE O/P EST MOD 30 MIN: CPT | Mod: TH

## 2020-01-08 RX ORDER — TERCONAZOLE 8 MG/G
0.8 CREAM VAGINAL
Qty: 1 | Refills: 1 | Status: COMPLETED | COMMUNITY
Start: 2019-10-30 | End: 2020-01-08

## 2020-01-08 RX ORDER — HYDROCORTISONE 10 MG/G
1 CREAM TOPICAL
Qty: 1 | Refills: 0 | Status: COMPLETED | COMMUNITY
Start: 2019-09-30 | End: 2020-01-08

## 2020-01-08 RX ORDER — METRONIDAZOLE 7.5 MG/G
0.75 GEL VAGINAL
Qty: 1 | Refills: 0 | Status: COMPLETED | COMMUNITY
Start: 2019-10-02 | End: 2020-01-08

## 2020-01-08 RX ORDER — NORETHINDRONE 0.35 MG/1
0.35 TABLET ORAL DAILY
Qty: 1 | Refills: 5 | Status: ACTIVE | COMMUNITY
Start: 2020-01-08 | End: 1900-01-01

## 2020-01-08 RX ORDER — TERCONAZOLE 8 MG/G
0.8 CREAM VAGINAL
Qty: 1 | Refills: 3 | Status: COMPLETED | COMMUNITY
Start: 2019-09-30 | End: 2020-01-08

## 2020-01-08 NOTE — HISTORY OF PRESENT ILLNESS
[Complications:___] : complications include: [unfilled] [Postpartum Follow Up] : postpartum follow up [Last Pap Date: ___] : Last Pap Date: [unfilled] [Delivery Date: ___] : on [unfilled] [Primary C/S] : delivered by  section [Female] : Delivery History: baby girl [Wt. ___] : weighing [unfilled] [BF with Difficulty] : nursing with difficulty [Discharge HCT: ___] : hematocrit level was [unfilled] [Discharge HGB: ___] : hemoglobin level was [unfilled] [Resumed Menses] : has resumed her menses [Intended Contraception] : Intended Contraception: [Hormone Implants] : hormone implants [BreastFeeding Problems] : breastfeeding problems [Back to Normal] : is back to normal in size [None] : no vaginal bleeding [Normal] : the vagina was normal [Rhogam] : Rhogam was not administered [BTL] : no tubal ligation [Pertussis Vaccine] : Pertussis vaccine was not administered [Rubella Vaccine] : Rubella vaccine was not administered [Resumed [place]] : has not resumed intercourse [Breast Pain] : no breast pain [Chest Pain] : no chest pain [Cracked Nipples] : no cracked nipples [Cervix Sample Taken] : cervical sample not taken for a Pap smear

## 2020-01-09 LAB
BASOPHILS # BLD AUTO: 0.02 K/UL
BASOPHILS NFR BLD AUTO: 0.3 %
CANDIDA VAG CYTO: NOT DETECTED
EOSINOPHIL # BLD AUTO: 0.13 K/UL
EOSINOPHIL NFR BLD AUTO: 2 %
G VAGINALIS+PREV SP MTYP VAG QL MICRO: NOT DETECTED
HCT VFR BLD CALC: 44.1 %
HGB BLD-MCNC: 14.3 G/DL
IMM GRANULOCYTES NFR BLD AUTO: 0.3 %
LYMPHOCYTES # BLD AUTO: 2.43 K/UL
LYMPHOCYTES NFR BLD AUTO: 37 %
MAN DIFF?: NORMAL
MCHC RBC-ENTMCNC: 28.7 PG
MCHC RBC-ENTMCNC: 32.4 GM/DL
MCV RBC AUTO: 88.4 FL
MONOCYTES # BLD AUTO: 0.48 K/UL
MONOCYTES NFR BLD AUTO: 7.3 %
NEUTROPHILS # BLD AUTO: 3.48 K/UL
NEUTROPHILS NFR BLD AUTO: 53.1 %
PLATELET # BLD AUTO: 303 K/UL
RBC # BLD: 4.99 M/UL
RBC # FLD: 11.9 %
T VAGINALIS VAG QL WET PREP: NOT DETECTED
WBC # FLD AUTO: 6.56 K/UL

## 2020-06-22 ENCOUNTER — APPOINTMENT (OUTPATIENT)
Dept: OBGYN | Facility: CLINIC | Age: 30
End: 2020-06-22

## 2020-12-21 PROBLEM — Z86.19 HISTORY OF CANDIDAL VULVOVAGINITIS: Status: RESOLVED | Noted: 2019-09-30 | Resolved: 2020-12-21

## 2020-12-21 PROBLEM — N76.0 BACTERIAL VAGINOSIS: Status: RESOLVED | Noted: 2019-10-02 | Resolved: 2020-12-21

## 2021-04-10 NOTE — PROGRESS NOTE ADULT - PROBLEM SELECTOR PLAN 1
Oxycodone, Rapid Release (By mouth)   Oxycodone Hydrochloride (iq-b-VRZ-done maegan-droe-KLOR-susie)  Treats moderate to severe pain  This medicine is a narcotic pain reliever  Brand Name(s): Oxaydo, Oxy IR, Roxicodone   There may be other brand names for this medicine  When This Medicine Should Not Be Used: This medicine is not right for everyone  Do not use it if you had an allergic reaction to oxycodone, codeine, hydrocodone, dihydrocodeine, or morphine, or if you have severe lung or breathing problems, or stomach or bowel blockage (including paralytic ileus)  How to Use This Medicine:   Capsule, Liquid, Tablet  · Take your medicine as directed  Your dose may need to be changed several times to find what works best for you  · An overdose can be dangerous  Follow directions carefully so you do not get too much medicine at one time  · Oral liquid: Measure the oral liquid medicine with a marked measuring spoon, oral syringe, or medicine cup  · Oxaydo® tablet: Swallow it whole with enough water to swallow it completely  Do not break, crush, chew, or dissolve it  Do not wet the tablet before you put it in your mouth  · While taking the Roxybond tablet, part of it may pass into your stool  This is normal and nothing to worry about  · This medicine should come with a Medication Guide  Ask your pharmacist for a copy if you do not have one  · Missed dose: Take a dose as soon as you remember  If it is almost time for your next dose, wait until then and take a regular dose  Do not take extra medicine to make up for a missed dose  If you miss a dose of Roxybond or Roxicodone®, skip the missed dose and go back to your regular dosing schedule  · Store the medicine in a closed container at room temperature, away from heat, moisture, and direct light  Store the medicine in a secure place to prevent others from getting it  Drop off any unused narcotic medicine at a drug take-back location right away   If you do not have a drug take-back location near you, flush any unused narcotic medicine down the toilet  Check your local drug store and clinics for take-back locations  You can also check the [employer] web site for locations  Here is the link to the FDA safe disposal of medicines website: www fda gov/drugs/resourcesforyou/consumers/buyingusingmedicinesafely/ensuringsafeuseofmedicine/safedisposalofmedicines/hzu441929 htm  Drugs and Foods to Avoid:   Ask your doctor or pharmacist before using any other medicine, including over-the-counter medicines, vitamins, and herbal products  · Do not use this medicine if you are using or have used an MAO inhibitor within the past 14 days  · Some medicines can affect how oxycodone works  Tell your doctor if you are using any of the following:  ? Amiodarone, carbamazepine, cyclobenzaprine, erythromycin, ketoconazole, metaxalone, mirtazapine, phenytoin, quinidine, rifampin, ritonavir, tramadol, trazodone  ? Diuretic (water pill)  ? Medicine to treat depression, anxiety, or mental health  ? Medicine to treat migraine headaches  ? Phenothiazine medicine  · Tell your doctor if you use anything else that makes you sleepy  Some examples are allergy medicine, narcotic pain medicine, and alcohol  Tell your doctor if you are also using buprenorphine, butorphanol, nalbuphine, pentazocine, or a muscle relaxer  · Do not drink alcohol while you are using this medicine  Warnings While Using This Medicine:   · Tell your doctor if you are pregnant or breastfeeding, or if you have kidney disease, liver disease, heart disease, low blood pressure, lung disease or breathing problems (including asthma, COPD, sleep apnea), scoliosis, an enlarged prostate, trouble urinating or swallowing, thyroid problems, Andrés disease, gallbladder or pancreas problems, or digestion problems  Tell your doctor if you have a history of head injury, brain tumor, mental health problems, seizures, or alcohol or drug addiction    · This medicine may cause the following problems:  ? Increased risk of overdose, which can lead to death  ? Respiratory depression (serious breathing problem that can be life-threatening)  ? Sleep-related breathing problems (including sleep apnea, sleep-related hypoxemia)  ? Serotonin syndrome, when used with certain medicines  · This medicine may make you dizzy, drowsy, or lightheaded  Do not drive or do anything else that could be dangerous until you know how this medicine affects you  Sit or lie down if you feel dizzy  Stand up carefully  · This medicine can be habit-forming  Do not use more than your prescribed dose  Call your doctor if you think your medicine is not working  · Do not stop using this medicine suddenly  Your doctor will need to slowly decrease your dose before you stop it completely  · This medicine may cause constipation, especially with long-term use  Ask your doctor if you should use a laxative to prevent and treat constipation  Drink plenty of liquids to help avoid constipation  · This medicine could cause infertility  Talk with your doctor before using this medicine if you plan to have children  · Keep all medicine out of the reach of children  Never share your medicine with anyone    Possible Side Effects While Using This Medicine:   Call your doctor right away if you notice any of these side effects:  · Allergic reaction: Itching or hives, swelling in your face or hands, swelling or tingling in your mouth or throat, chest tightness, trouble breathing  · Anxiety, restlessness, fast heartbeat, fever, sweating, muscle spasms, twitching, nausea, vomiting, diarrhea, seeing or hearing things that are not there  · Blue lips, fingernails, or skin, trouble breathing  · Extreme dizziness or weakness, shallow breathing, slow heartbeat, sweating, cold or clammy skin, seizures  · Lightheadedness, dizziness, fainting  · Severe constipation, stomach pain  If you notice these less serious side effects, talk with your doctor:   · Mild constipation  · Sleepiness, tiredness  If you notice other side effects that you think are caused by this medicine, tell your doctor  Call your doctor for medical advice about side effects  You may report side effects to FDA at 3-239-FDA-5196  © Copyright [address] [place] [address] Drive Information is for End User's use only and may not be sold, redistributed or otherwise used for commercial purposes  The above information is an  only  It is not intended as medical advice for individual conditions or treatments  Talk to your doctor, nurse or pharmacist before following any medical regimen to see if it is safe and effective for you  Pain management  continue breastfeeding  OOB/ incentive spirometer use encouraged  VTE prophylaxis
